# Patient Record
Sex: FEMALE
[De-identification: names, ages, dates, MRNs, and addresses within clinical notes are randomized per-mention and may not be internally consistent; named-entity substitution may affect disease eponyms.]

---

## 2019-08-13 ENCOUNTER — HOSPITAL ENCOUNTER (OUTPATIENT)
Dept: HOSPITAL 5 - ED | Age: 54
Setting detail: OBSERVATION
LOS: 2 days | Discharge: HOME | End: 2019-08-15
Attending: INTERNAL MEDICINE | Admitting: INTERNAL MEDICINE
Payer: COMMERCIAL

## 2019-08-13 DIAGNOSIS — R07.89: Primary | ICD-10-CM

## 2019-08-13 DIAGNOSIS — I10: ICD-10-CM

## 2019-08-13 DIAGNOSIS — Z90.49: ICD-10-CM

## 2019-08-13 DIAGNOSIS — I26.99: ICD-10-CM

## 2019-08-13 DIAGNOSIS — G43.909: ICD-10-CM

## 2019-08-13 DIAGNOSIS — M19.90: ICD-10-CM

## 2019-08-13 DIAGNOSIS — E87.6: ICD-10-CM

## 2019-08-13 LAB
BASOPHILS # (AUTO): 0.1 K/MM3 (ref 0–0.1)
BASOPHILS NFR BLD AUTO: 0.6 % (ref 0–1.8)
BUN SERPL-MCNC: 10 MG/DL (ref 7–17)
BUN/CREAT SERPL: 14 %
CALCIUM SERPL-MCNC: 9 MG/DL (ref 8.4–10.2)
EOSINOPHIL # BLD AUTO: 0 K/MM3 (ref 0–0.4)
EOSINOPHIL NFR BLD AUTO: 0.1 % (ref 0–4.3)
HCT VFR BLD CALC: 38.6 % (ref 30.3–42.9)
HEMOLYSIS INDEX: 16
HGB BLD-MCNC: 13.1 GM/DL (ref 10.1–14.3)
LYMPHOCYTES # BLD AUTO: 2.3 K/MM3 (ref 1.2–5.4)
LYMPHOCYTES NFR BLD AUTO: 19.7 % (ref 13.4–35)
MCHC RBC AUTO-ENTMCNC: 34 % (ref 30–34)
MCV RBC AUTO: 87 FL (ref 79–97)
MONOCYTES # (AUTO): 0.9 K/MM3 (ref 0–0.8)
MONOCYTES % (AUTO): 7.3 % (ref 0–7.3)
PLATELET # BLD: 241 K/MM3 (ref 140–440)
RBC # BLD AUTO: 4.42 M/MM3 (ref 3.65–5.03)

## 2019-08-13 PROCEDURE — 36415 COLL VENOUS BLD VENIPUNCTURE: CPT

## 2019-08-13 PROCEDURE — 93005 ELECTROCARDIOGRAM TRACING: CPT

## 2019-08-13 PROCEDURE — 93010 ELECTROCARDIOGRAM REPORT: CPT

## 2019-08-13 PROCEDURE — 96376 TX/PRO/DX INJ SAME DRUG ADON: CPT

## 2019-08-13 PROCEDURE — 93880 EXTRACRANIAL BILAT STUDY: CPT

## 2019-08-13 PROCEDURE — 85379 FIBRIN DEGRADATION QUANT: CPT

## 2019-08-13 PROCEDURE — A9540 TC99M MAA: HCPCS

## 2019-08-13 PROCEDURE — 99284 EMERGENCY DEPT VISIT MOD MDM: CPT

## 2019-08-13 PROCEDURE — 93306 TTE W/DOPPLER COMPLETE: CPT

## 2019-08-13 PROCEDURE — 85025 COMPLETE CBC W/AUTO DIFF WBC: CPT

## 2019-08-13 PROCEDURE — 80048 BASIC METABOLIC PNL TOTAL CA: CPT

## 2019-08-13 PROCEDURE — A9502 TC99M TETROFOSMIN: HCPCS

## 2019-08-13 PROCEDURE — 78452 HT MUSCLE IMAGE SPECT MULT: CPT

## 2019-08-13 PROCEDURE — 71275 CT ANGIOGRAPHY CHEST: CPT

## 2019-08-13 PROCEDURE — 85007 BL SMEAR W/DIFF WBC COUNT: CPT

## 2019-08-13 PROCEDURE — 93017 CV STRESS TEST TRACING ONLY: CPT

## 2019-08-13 PROCEDURE — A9558 XE133 XENON 10MCI: HCPCS

## 2019-08-13 PROCEDURE — 70450 CT HEAD/BRAIN W/O DYE: CPT

## 2019-08-13 PROCEDURE — 78582 LUNG VENTILAT&PERFUS IMAGING: CPT

## 2019-08-13 PROCEDURE — 96372 THER/PROPH/DIAG INJ SC/IM: CPT

## 2019-08-13 PROCEDURE — G0378 HOSPITAL OBSERVATION PER HR: HCPCS

## 2019-08-13 PROCEDURE — 84484 ASSAY OF TROPONIN QUANT: CPT

## 2019-08-13 PROCEDURE — 96374 THER/PROPH/DIAG INJ IV PUSH: CPT

## 2019-08-13 PROCEDURE — 83036 HEMOGLOBIN GLYCOSYLATED A1C: CPT

## 2019-08-13 PROCEDURE — 96375 TX/PRO/DX INJ NEW DRUG ADDON: CPT

## 2019-08-13 PROCEDURE — 80053 COMPREHEN METABOLIC PANEL: CPT

## 2019-08-13 PROCEDURE — 71045 X-RAY EXAM CHEST 1 VIEW: CPT

## 2019-08-13 PROCEDURE — 85027 COMPLETE CBC AUTOMATED: CPT

## 2019-08-13 RX ADMIN — LOSARTAN POTASSIUM SCH MG: 25 TABLET, FILM COATED ORAL at 20:17

## 2019-08-13 RX ADMIN — ASPIRIN SCH MG: 81 TABLET, COATED ORAL at 20:18

## 2019-08-13 RX ADMIN — Medication SCH ML: at 21:50

## 2019-08-13 RX ADMIN — HYDROMORPHONE HYDROCHLORIDE PRN MG: 1 INJECTION, SOLUTION INTRAMUSCULAR; INTRAVENOUS; SUBCUTANEOUS at 22:58

## 2019-08-13 RX ADMIN — POTASSIUM CHLORIDE SCH MEQ: 1500 TABLET, EXTENDED RELEASE ORAL at 20:18

## 2019-08-13 RX ADMIN — HYDROMORPHONE HYDROCHLORIDE PRN MG: 1 INJECTION, SOLUTION INTRAMUSCULAR; INTRAVENOUS; SUBCUTANEOUS at 20:18

## 2019-08-13 RX ADMIN — ENOXAPARIN SODIUM SCH MG: 100 INJECTION SUBCUTANEOUS at 21:50

## 2019-08-13 RX ADMIN — FAMOTIDINE SCH MG: 10 INJECTION, SOLUTION INTRAVENOUS at 21:50

## 2019-08-13 NOTE — NUCLEAR MEDICINE REPORT
NM lung scan perf/vent



INDICATION:

elevated ddimer, cp, syncope.



TECHNIQUE: 

4.58 mCi of technetium 99m labeled MAA and 8.19 mCi of xenon 133 gas administered



COMPARISON: 

Chest radiograph dated 8/13/2019



FINDINGS:

There are 2 small unmatched perfusion defects in the right apex. No other significant perfusion defec
ts are identified. No focal abnormality is seen in the right apex on chest radiograph







IMPRESSION:

1. VQ scan is intermediate probability for pulmonary embolus..



Signer Name: Matteo Barajas MD 

Signed: 8/13/2019 12:59 PM

 Workstation Name: [id number]

## 2019-08-13 NOTE — XRAY REPORT
CHEST 1 VIEW 



INDICATION:  Left chest pain for one day.



COMPARISON:  None



FINDINGS:

Support devices: None. 



Heart: Within normal limits. 

Lungs/Pleura: No acute air space or interstitial disease. 



Additional findings: None.



IMPRESSION:

 No acute findings.



Signer Name: Christian Braga Jr, MD 

Signed: 8/13/2019 10:40 AM

 Workstation Name: [id number]

## 2019-08-13 NOTE — CAT SCAN REPORT
CT HEAD WITHOUT CONTRAST



INDICATION / CLINICAL INFORMATION:

syncope, mild head injury.



TECHNIQUE:

All CT scans at this location are performed using CT dose reduction for ALARA by means of automated e
xposure control. 



COMPARISON:

None available.



FINDINGS:

HEMORRHAGE: No evidence of intracranial hemorrhage or extra-axial fluid collection.

EXTRA-AXIAL SPACES: Cortical sulci, sylvian fissures and basilar cisterns have an unremarkable appear
ance.

VENTRICULAR SYSTEM: The ventricular system is of normal size and configuration.

CEREBRAL PARENCHYMA: No areas of abnormal brain parenchymal attenuation are identified. There is no i
ndication of recent infarction. 

MIDLINE SHIFT OR HERNIATION: There is no mass effect.

CEREBELLUM / BRAINSTEM: Brainstem and cerebellum have an unremarkable appearance.

INTRACRANIAL VESSELS:No abnormalities are identified on this noncontrast head CT.

ORBITS: visualized portions of the orbits have an unremarkable appearance.

SOFT TISSUES of HEAD: No significant abnormality.

CALVARIUM: Evaluation of bone windows reveals no abnormalities.

PARANASAL SINUSES / MASTOID AIR CELLS: Paranasal sinuses are free from inflammatory mucosal disease. 
Mastoid air cells are normally pneumatized.





IMPRESSION:

1. No acute intracranial abnormality.



Signer Name: Magdaleno Perez MD 

Signed: 8/13/2019 1:04 PM

 Workstation Name: [email or web address]

## 2019-08-13 NOTE — HISTORY AND PHYSICAL REPORT
History of Present Illness


Date of examination: 08/13/19


Date of admission: 


08/13/19 14:24





Chief complaint: 


Left-sided chest pain since a.m.





History of present illness: 


53-year-old  female with history of depression and gastroesophageal 

reflux disease comes in for left-sided chest pain since a.m.  Chest pain is 

retrosternal and intermittent in nature.  Chest pain is about 6 and a scale of 

1-10.  Dull in character radiating to the neck and the back.  No diaphoresis or 

palpitations.  Pain woke her up from sleep this morning.  Patient states she 

also passed out well going to [employer].  No focal weakness.  No recent alcohol or

drug use.  Does not smoke.  No exacerbating or relieving factors.  No recent 

travel.  No swelling of the legs.  No shortness of breath.











Past Medical History


Hypertension: Yes--not on any medications


Arthritis: Yes


Headaches / Migraines: Yes (migraines)


Seizures: Yes (7 years ago x1, due to ETOH)--not on any medications


Psychiatric Treatment:  (depression)





Surgical History


Cholecystectomy: Yes


Additional Surgical History: TWISTED BOWEL.  Hysterectomy, salpingectomy, 

oophorectomy, small bowel resection with reanastomosis





Social History    


Smoking Status: Never Smoker


Substance Use Type: None  





Family history


Hypertension





Medications


Home Medications: 


Only on Celexa 40 mg by mouth daily


Not on any other medications


                                        





Review of Systems


ROS: 


Stated complaint: CHEST PAIN


Other details as noted in HPI


Comment: All other systems reviewed and negative


14 point review of systems done














Medications and Allergies


                                    Allergies











Allergy/AdvReac Type Severity Reaction Status Date / Time


 


acetaminophen [From [place]] Allergy  Vomiting Verified 04/19/15 18:04


 


hydrocodone bitartrate Allergy  Vomiting Verified 04/19/15 18:04





[From [place]]     











                                Home Medications











 Medication  Instructions  Recorded  Confirmed  Last Taken  Type


 


Citalopram Hydrobromide [Celexa] 40 mg PO DAILY 08/13/19 08/13/19 08/13/19 

History











Active Meds: 


Active Medications





Acetaminophen (Tylenol)  650 mg PO Q4H PRN


   PRN Reason: Pain MILD(1-3)/Fever >100.5/HA


Aspirin (Aspirin)  81 mg PO QDAY [facility]RELL[facility]


Enoxaparin Sodium (Lovenox)  40 mg SUB-Q QDAY@2200 [facility]RELL[facility]


Famotidine (Pepcid)  20 mg IV BID [facility]


Hydromorphone HCl (Dilaudid)  0.5 mg IV Q3H PRN


   PRN Reason: Pain , Severe (7-10)


Miscellaneous Medication (Citalopram Hydrobromide [Celexa])  40 mg PO DAILY [facility]


Ondansetron HCl (Zofran)  4 mg IV Q8H PRN


   PRN Reason: Nausea And Vomiting


Oxycodone/Acetaminophen (Percocet 5/325)  1 tab PO Q6H PRN


   PRN Reason: Pain, Moderate (4-6)


Sodium Chloride (Sodium Chloride Flush Syringe 10 Ml)  10 ml IV BID [facility]


Sodium Chloride (Sodium Chloride Flush Syringe 10 Ml)  10 ml IV PRN PRN


   PRN Reason: LINE FLUSH











Exam





- Constitutional


Vitals: 


                                        











Temp Pulse Resp BP Pulse Ox


 


 98.2 F   71   15   158/89   97 


 


 08/13/19 13:30  08/13/19 13:30  08/13/19 15:55  08/13/19 13:30  08/13/19 13:30














Results





- Labs


CBC & Chem 7: 


                                 08/13/19 10:00





                                 08/13/19 10:00


Labs: 


                             Laboratory Last Values











WBC  11.7 K/mm3 (4.5-11.0)  H  08/13/19  10:00    


 


RBC  4.42 M/mm3 (3.65-5.03)   08/13/19  10:00    


 


Hgb  13.1 gm/dl (10.1-14.3)   08/13/19  10:00    


 


Hct  38.6 % (30.3-42.9)   08/13/19  10:00    


 


MCV  87 fl (79-97)   08/13/19  10:00    


 


MCH  30 pg (28-32)   08/13/19  10:00    


 


MCHC  34 % (30-34)   08/13/19  10:00    


 


RDW  14.3 % (13.2-15.2)   08/13/19  10:00    


 


Plt Count  241 K/mm3 (140-440)   08/13/19  10:00    


 


Lymph % (Auto)  19.7 % (13.4-35.0)   08/13/19  10:00    


 


Mono % (Auto)  7.3 % (0.0-7.3)   08/13/19  10:00    


 


Eos % (Auto)  0.1 % (0.0-4.3)   08/13/19  10:00    


 


Baso % (Auto)  0.6 % (0.0-1.8)   08/13/19  10:00    


 


Lymph #  2.3 K/mm3 (1.2-5.4)   08/13/19  10:00    


 


Mono #  0.9 K/mm3 (0.0-0.8)  H  08/13/19  10:00    


 


Eos #  0.0 K/mm3 (0.0-0.4)   08/13/19  10:00    


 


Baso #  0.1 K/mm3 (0.0-0.1)   08/13/19  10:00    


 


Seg Neutrophils %  72.3 % (40.0-70.0)  H  08/13/19  10:00    


 


Seg Neutrophils #  8.5 K/mm3 (1.8-7.7)  H  08/13/19  10:00    


 


  556.32 ng/mlDDU (0-234)  H  08/13/19  10:00    


 


Sodium  137 mmol/L (137-145)   08/13/19  10:00    


 


Potassium  3.5 mmol/L (3.6-5.0)  L  08/13/19  10:00    


 


Chloride  99.9 mmol/L ()   08/13/19  10:00    


 


Carbon Dioxide  22 mmol/L (22-30)   08/13/19  10:00    


 


  19 mmol/L  08/13/19  10:00    


 


BUN  10 mg/dL (7-17)   08/13/19  10:00    


 


  0.7 mg/dL (0.7-1.2)   08/13/19  10:00    


 


Estimated GFR  > 60 ml/min  08/13/19  10:00    


 


  14 %  08/13/19  10:00    


 


Glucose  95 mg/dL ()   08/13/19  10:00    


 


Calcium  9.0 mg/dL (8.4-10.2)   08/13/19  10:00    


 


  < 0.010 ng/mL (0.00-0.029)   08/13/19  15:42    














- Imaging and Cardiology


EKG: report reviewed (normal sinus rhythm, heart rate of 90/m no ST-T wave 

changes)


Chest x-ray: report reviewed (no acute findings)


CT Scan - head: report reviewed (no acute findings)





Assessment and Plan


Advance Directives: Yes (full code)


VTE prophylaxis?: Chemical


Plan of care discussed with patient/family: Yes





- Patient Problems


(1) Chest pain


Current Visit: Yes   Status: Acute   


Qualifiers: 


   Chest pain type: unspecified   Qualified Code(s): R07.9 - Chest pain, 

unspecified   


Plan to address problem: 


Chest pain rule out MI protocol


Serial troponins


Lexiscan in the morning


GERD in the differential diagnosis


Costochondritis unlikely--no chest wall tenderness








(2) Hypertension


Current Visit: Yes   Status: Chronic   


Qualifiers: 


   Hypertension type: unspecified   Qualified Code(s): I10 - Essential (primary)

 hypertension   


Plan to address problem: 


Borderline


Will initiate on losartan 25 mg once a day








(3) Depression


Current Visit: Yes   Status: Chronic   


Qualifiers: 


   Depression Type: unspecified   Qualified Code(s): F32.9 - Major depressive 

disorder, single episode, unspecified   


Plan to address problem: 


Continue Celexa








(4) Hypokalemia


Current Visit: Yes   Status: Acute   


Plan to address problem: 


Mild


Supplemented








(5) DVT prophylaxis


Current Visit: Yes   Status: Acute   


Plan to address problem: 


Patient on Lovenox and GI prophylaxis

## 2019-08-13 NOTE — EMERGENCY DEPARTMENT REPORT
ED Chest Pain HPI





- General


Chief Complaint: Chest Pain


Stated Complaint: CHEST PAIN


Time Seen by Provider: 08/13/19 10:18


Source: patient, EMS


Mode of arrival: Stretcher


Limitations: No Limitations





- History of Present Illness


Initial Comments: 





53-year-old female the past medical history of migraines, hypertension, 

depression, previous alcohol abuse presents to the hospital foc chest pain since

midnight.  Pain is anterior, left-sided, and radiates to the back.  Pain is 

moderate to severe, worse with inspiration, movement, and palpation rated 9/10 i

n intensity.  Patient also had associated shortness of breath and numbness to 

her hands.  Pain woke her up from sleep this morning at 6 AM.  She did she 

thought it was gas and took a Zantac and baking soda without relief.  She 

attempted to go to [employer] and while walking from her car she had a syncopal 

episode and struck the back of her head.  He denies blurry vision, focal 

weakness, or focal numbness.  She states she has not use alcohol or any drugs 

recently.  She only medication she takes is Celexa for depression.  She denies 

calf tenderness, leg edema, history of PE/DVT, or recent travel.  She also 

denies a personal history of hypertension, elevated cholesterol, and diabetes.


Severity scale (0 -10): 9





- Related Data


                                Home Medications











 Medication  Instructions  Recorded  Confirmed  Last Taken


 


ALPRAZolam [Xanax TAB] 0.5 mg PO PRN PRN 03/12/15 06/18/15 04/19/15 09:00


 


buPROPion SR [Wellbutrin SR] 150 mg PO DAILY 03/12/15 06/18/15 06/16/15


 


Linzess 1 tab PO DAILY 06/18/15 06/18/15 06/17/15


 


traMADol 1 tab PO Q6HR 06/18/15 06/18/15 06/18/15








                                  Previous Rx's











 Medication  Instructions  Recorded  Last Taken  Type


 


Ibuprofen [Motrin 800 MG tab] 800 mg PO Q8H PRN #30 tablet 03/27/15 04/19/15 

09:00 Rx


 


Pantoprazole Sodium [Protonix] 40 mg PO QDAY #30 tablet. 04/19/15 Unknown Rx


 


Sulfamethoxazole/Trimethoprim 1 each PO BID #14 tablet 04/19/15 Unknown Rx





[Bactrim Ds]    


 


oxyCODONE /ACETAMINOPHEN [Percocet 1 tab PO Q6HR PRN #10 tablet 04/19/15 Unknown

 Rx





5/325 mg]    











                                    Allergies











Allergy/AdvReac Type Severity Reaction Status Date / Time


 


acetaminophen [From [place]co] Allergy  Vomiting Verified 04/19/15 18:04


 


hydrocodone bitartrate Allergy  Vomiting Verified 04/19/15 18:04





[From [place]]     














Heart Score





- HEART Score


History: Slightly suspicious


EKG: Non-specific


Age: 45-65


Risk factors: 1-2 risk factors


Troponin: < normal limit


HEART Score: 3





ED Review of Systems


ROS: 


Stated complaint: CHEST PAIN


Other details as noted in HPI





Comment: All other systems reviewed and negative





ED Past Medical Hx





- Past Medical History


Hx Hypertension: Yes


Hx Congestive Heart Failure: No


Hx Diabetes: No


Hx Liver Disease: No


Hx Renal Disease: No


Hx Arthritis: Yes


Hx Headaches / Migraines: Yes (migraines)


Hx Seizures: Yes (7 years ago x1, due to ETOH)


Hx Psychiatric Treatment:  (depression)


Hx Asthma: No


Hx COPD: No





- Surgical History


Hx Cholecystectomy: Yes


Additional Surgical History: TWISTED BOWEL.  Hysterectomy, salpingectomy, 

oophorectomy, small bowel resection with reanastomosis





- Social History


Smoking Status: Never Smoker


Substance Use Type: None





- Medications


Home Medications: 


                                Home Medications











 Medication  Instructions  Recorded  Confirmed  Last Taken  Type


 


ALPRAZolam [Xanax TAB] 0.5 mg PO PRN PRN 03/12/15 06/18/15 04/19/15 09:00 

History


 


buPROPion SR [Wellbutrin SR] 150 mg PO DAILY 03/12/15 06/18/15 06/16/15 History


 


Ibuprofen [Motrin 800 MG tab] 800 mg PO Q8H PRN #30 tablet 03/27/15 06/18/15 0

4/19/15 09:00 Rx


 


Pantoprazole Sodium [Protonix] 40 mg PO QDAY #30 tablet. 04/19/15 06/18/15 

Unknown Rx


 


Sulfamethoxazole/Trimethoprim 1 each PO BID #14 tablet 04/19/15 06/18/15 Unknown

 Rx





[Bactrim Ds]     


 


oxyCODONE /ACETAMINOPHEN [Percocet 1 tab PO Q6HR PRN #10 tablet 04/19/15 

06/18/15 Unknown Rx





5/325 mg]     


 


Linzess 1 tab PO DAILY 06/18/15 06/18/15 06/17/15 History


 


traMADol 1 tab PO Q6HR 06/18/15 06/18/15 06/18/15 History














ED Physical Exam





- General


Limitations: No Limitations





- Other


Other exam information: 





General:  no acute distress


Head: Atraumatic, normocephalic


Eyes:  Normal appearance, pupils equal and reactive to light, extraocular 

movements intact


ENT: normal oropharynx


Neck: Normal appearance,  no stridor, no meningismus, no midline tenderness.


Cardiovascular: Regular rate and rhythm, anterior chest wall tenderness on 

palpation


Chest: Clear to auscultation, no wheezes, rales, or crackles


Abdomen: nondistended, soft, nontender, no rebound or guarding


Extremity: Normal appearance, no deformity, full range of motion, no calf 

tenderness or leg edema


Neuro: Alert and oriented 3, clear speech, no gross motor or sensory deficit


Skin: No warmth, erythema





ED Course





                                   Vital Signs











  08/13/19 08/13/19 08/13/19





  09:56 10:35 11:00


 


Temperature 99.6 F  


 


Pulse Rate 91 H  78


 


Respiratory 19 20 16





Rate   


 


Blood Pressure 145/86  146/95





[Right]   


 


O2 Sat by Pulse 98  96





Oximetry   














  08/13/19 08/13/19





  13:30 13:42


 


Temperature 98.2 F 


 


Pulse Rate 71 


 


Respiratory 16 16





Rate  


 


Blood Pressure 158/89 





[Right]  


 


O2 Sat by Pulse 97 





Oximetry  














ARIELLA score





- Ariella Score


Age > 65: (0) No


Aspirin use within the Past 7 Days: (0) No


3 or more CAD Risk Factors: (0) No


2 or more Angina events in past 24 hrs: (0) No


Known CAD with more than 50% Stenosis: (0) No


Elevated Cardiac Markers: (0) No





ED Medical Decision Making





- Lab Data


Result diagrams: 


                                 08/13/19 10:00





                                 08/13/19 10:00








                                   Lab Results











  08/13/19 08/13/19 08/13/19 Range/Units





  10:00 10:00 10:00 


 


WBC  11.7 H    (4.5-11.0)  K/mm3


 


RBC  4.42    (3.65-5.03)  M/mm3


 


Hgb  13.1    (10.1-14.3)  gm/dl


 


Hct  38.6    (30.3-42.9)  %


 


MCV  87    (79-97)  fl


 


MCH  30    (28-32)  pg


 


MCHC  34    (30-34)  %


 


RDW  14.3    (13.2-15.2)  %


 


Plt Count  241    (140-440)  K/mm3


 


Lymph % (Auto)  19.7    (13.4-35.0)  %


 


Mono % (Auto)  7.3    (0.0-7.3)  %


 


Eos % (Auto)  0.1    (0.0-4.3)  %


 


Baso % (Auto)  0.6    (0.0-1.8)  %


 


Lymph #  2.3    (1.2-5.4)  K/mm3


 


Mono #  0.9 H    (0.0-0.8)  K/mm3


 


Eos #  0.0    (0.0-0.4)  K/mm3


 


Baso #  0.1    (0.0-0.1)  K/mm3


 


Seg Neutrophils %  72.3 H    (40.0-70.0)  %


 


Seg Neutrophils #  8.5 H    (1.8-7.7)  K/mm3


 


D-Dimer    556.32 H  (0-234)  ng/mlDDU


 


Sodium   137   (137-145)  mmol/L


 


Potassium   3.5 L   (3.6-5.0)  mmol/L


 


Chloride   99.9   ()  mmol/L


 


Carbon Dioxide   22   (22-30)  mmol/L


 


Anion Gap   19   mmol/L


 


BUN   10   (7-17)  mg/dL


 


Creatinine   0.7   (0.7-1.2)  mg/dL


 


Estimated GFR   > 60   ml/min


 


BUN/Creatinine Ratio   14   %


 


Glucose   95   ()  mg/dL


 


Calcium   9.0   (8.4-10.2)  mg/dL


 


Troponin T   < 0.010   (0.00-0.029)  ng/mL














  08/13/19 Range/Units





  13:09 


 


WBC   (4.5-11.0)  K/mm3


 


RBC   (3.65-5.03)  M/mm3


 


Hgb   (10.1-14.3)  gm/dl


 


Hct   (30.3-42.9)  %


 


MCV   (79-97)  fl


 


MCH   (28-32)  pg


 


MCHC   (30-34)  %


 


RDW   (13.2-15.2)  %


 


Plt Count   (140-440)  K/mm3


 


Lymph % (Auto)   (13.4-35.0)  %


 


Mono % (Auto)   (0.0-7.3)  %


 


Eos % (Auto)   (0.0-4.3)  %


 


Baso % (Auto)   (0.0-1.8)  %


 


Lymph #   (1.2-5.4)  K/mm3


 


Mono #   (0.0-0.8)  K/mm3


 


Eos #   (0.0-0.4)  K/mm3


 


Baso #   (0.0-0.1)  K/mm3


 


Seg Neutrophils %   (40.0-70.0)  %


 


Seg Neutrophils #   (1.8-7.7)  K/mm3


 


D-Dimer   (0-234)  ng/mlDDU


 


Sodium   (137-145)  mmol/L


 


Potassium   (3.6-5.0)  mmol/L


 


Chloride   ()  mmol/L


 


Carbon Dioxide   (22-30)  mmol/L


 


Anion Gap   mmol/L


 


BUN   (7-17)  mg/dL


 


Creatinine   (0.7-1.2)  mg/dL


 


Estimated GFR   ml/min


 


BUN/Creatinine Ratio   %


 


Glucose   ()  mg/dL


 


Calcium   (8.4-10.2)  mg/dL


 


Troponin T  < 0.010  (0.00-0.029)  ng/mL














- EKG Data


-: EKG Interpreted by Me


EKG shows normal: sinus rhythm, axis (qrs -65), QRS complexes (qrsd 88), ST-T 

waves (no stemi/t inv)


Rate: normal (90)





- Radiology Data


Radiology results: report reviewed








CHEST 1 VIEW 





INDICATION: Left chest pain for one day. 





COMPARISON: None 





FINDINGS: 


Support devices: None. 





Heart: Within normal limits. 


Lungs/Pleura: No acute air space or interstitial disease. 





Additional findings: None. 





IMPRESSION: 


No acute findings. 





CT HEAD WITHOUT CONTRAST 





INDICATION / CLINICAL INFORMATION: 


syncope, mild head injury. 





TECHNIQUE: 


All CT scans at this location are performed using CT dose reduction for ALARA by

 means of automated


exposure control. 





COMPARISON: 


None available. 





FINDINGS: 


HEMORRHAGE: No evidence of intracranial hemorrhage or extra-axial fluid 

collection. 


EXTRA-AXIAL SPACES: Cortical sulci, sylvian fissures and basilar cisterns have 

an unremarkable 


appearance. 


VENTRICULAR SYSTEM: The ventricular system is of normal size and configuration. 


CEREBRAL PARENCHYMA: No areas of abnormal brain parenchymal attenuation are 

identified. There is no


indication of recent infarction. 


MIDLINE SHIFT OR HERNIATION: There is no mass effect. 


CEREBELLUM / BRAINSTEM: Brainstem and cerebellum have an unremarkable 

appearance. 


INTRACRANIAL VESSELS:No abnormalities are identified on this noncontrast head 

CT. 


ORBITS: visualized portions of the orbits have an unremarkable appearance. 


SOFT TISSUES of HEAD: No significant abnormality. 


CALVARIUM: Evaluation of bone windows reveals no abnormalities. 


PARANASAL SINUSES / MASTOID AIR CELLS: Paranasal sinuses are free from 

inflammatory mucosal 


disease. Mastoid air cells are normally pneumatized. 








IMPRESSION: 


1. No acute intracranial abnormality. 





NM lung scan perf/vent 





INDICATION: 


elevated ddimer, cp, syncope. 





TECHNIQUE: 


4.58 mCi of technetium 99m labeled MAA and 8.19 mCi of xenon 133 gas 

administered 





COMPARISON: 


Chest radiograph dated 8/13/2019 





FINDINGS: 


There are 2 small unmatched perfusion defects in the right apex. No other 

significant perfusion 


defects are identified. No focal abnormality is seen in the right apex on chest 

radiograph 











IMPRESSION: 


1. VQ scan is intermediate probability for pulmonary embolus.. 





- Medical Decision Making


Physical chest pain, shortness of breath, and syncope.


Patient's elevated d-dimer with intermittent probability of a pulmonary embolism

 on V/Q exam.  She was provided  Lovenox and pain medicine and will be admitted 

to the hospital.





- Differential Diagnosis


costochondritis, MI, unstable angina, PE, dissection, arrhythmia, seizure


Critical Care Time: No


Critical care attestation.: 


If time is entered above; I have spent that time in minutes in the direct care 

of this critically ill patient, excluding procedure time.








ED Disposition


Clinical Impression: 


 Chest pain, Pulmonary embolism, Syncope





Disposition: [id number] OP ADMIT IP TO THIS HOSP


Is pt being admited?: Yes


Condition: Stable


Time of Disposition: 14:13 (Dr. Granados/hospitalist)

## 2019-08-14 LAB
ALBUMIN SERPL-MCNC: 4.2 G/DL (ref 3.9–5)
ALT SERPL-CCNC: 10 UNITS/L (ref 7–56)
BAND NEUTROPHILS # (MANUAL): 0.1 K/MM3
BUN SERPL-MCNC: 20 MG/DL (ref 7–17)
BUN/CREAT SERPL: 18 %
CALCIUM SERPL-MCNC: 8.8 MG/DL (ref 8.4–10.2)
HCT VFR BLD CALC: 40.2 % (ref 30.3–42.9)
HEMOLYSIS INDEX: 5
HGB BLD-MCNC: 13.4 GM/DL (ref 10.1–14.3)
MCHC RBC AUTO-ENTMCNC: 33 % (ref 30–34)
MCV RBC AUTO: 88 FL (ref 79–97)
MYELOCYTES # (MANUAL): 0 K/MM3
PLATELET # BLD: 271 K/MM3 (ref 140–440)
PROMYELOCYTES # (MANUAL): 0 K/MM3
RBC # BLD AUTO: 4.56 M/MM3 (ref 3.65–5.03)
TOTAL CELLS COUNTED BLD: 100

## 2019-08-14 RX ADMIN — HYDROMORPHONE HYDROCHLORIDE PRN MG: 1 INJECTION, SOLUTION INTRAMUSCULAR; INTRAVENOUS; SUBCUTANEOUS at 10:17

## 2019-08-14 RX ADMIN — POTASSIUM CHLORIDE SCH MEQ: 1500 TABLET, EXTENDED RELEASE ORAL at 10:05

## 2019-08-14 RX ADMIN — HYDROMORPHONE HYDROCHLORIDE PRN MG: 1 INJECTION, SOLUTION INTRAMUSCULAR; INTRAVENOUS; SUBCUTANEOUS at 13:29

## 2019-08-14 RX ADMIN — Medication SCH ML: at 10:09

## 2019-08-14 RX ADMIN — OXYCODONE AND ACETAMINOPHEN PRN TAB: 5; 325 TABLET ORAL at 19:22

## 2019-08-14 RX ADMIN — Medication SCH ML: at 21:00

## 2019-08-14 RX ADMIN — HYDROMORPHONE HYDROCHLORIDE PRN MG: 1 INJECTION, SOLUTION INTRAMUSCULAR; INTRAVENOUS; SUBCUTANEOUS at 06:03

## 2019-08-14 RX ADMIN — ENOXAPARIN SODIUM SCH MG: 100 INJECTION SUBCUTANEOUS at 21:00

## 2019-08-14 RX ADMIN — HYDROMORPHONE HYDROCHLORIDE PRN MG: 1 INJECTION, SOLUTION INTRAMUSCULAR; INTRAVENOUS; SUBCUTANEOUS at 23:59

## 2019-08-14 RX ADMIN — HYDROMORPHONE HYDROCHLORIDE PRN MG: 1 INJECTION, SOLUTION INTRAMUSCULAR; INTRAVENOUS; SUBCUTANEOUS at 20:51

## 2019-08-14 RX ADMIN — HYDROMORPHONE HYDROCHLORIDE PRN MG: 1 INJECTION, SOLUTION INTRAMUSCULAR; INTRAVENOUS; SUBCUTANEOUS at 02:06

## 2019-08-14 RX ADMIN — FAMOTIDINE SCH MG: 10 INJECTION, SOLUTION INTRAVENOUS at 21:00

## 2019-08-14 RX ADMIN — FAMOTIDINE SCH MG: 10 INJECTION, SOLUTION INTRAVENOUS at 10:06

## 2019-08-14 RX ADMIN — ASPIRIN SCH MG: 81 TABLET, COATED ORAL at 10:06

## 2019-08-14 RX ADMIN — LOSARTAN POTASSIUM SCH MG: 25 TABLET, FILM COATED ORAL at 10:17

## 2019-08-14 RX ADMIN — HYDROMORPHONE HYDROCHLORIDE PRN MG: 1 INJECTION, SOLUTION INTRAMUSCULAR; INTRAVENOUS; SUBCUTANEOUS at 17:25

## 2019-08-14 NOTE — PROGRESS NOTE
Assessment and Plan


Assessment and plan: 


Patient is a 54 yo  woman with a history of depression, OA, migraines, 

GERD, previous alcohol abuse and extensive secondhand smoke exposure who 

presented with CP, sob after syncopal episodes. Patient also had associated 

shortness of breath and numbness to her hands.  Pain woke her up from sleep this

morning at 6 AM.  She did she thought it was gas and took a Zantac and baking 

soda without relief.  [facility] and she had a syncopal 

episode and struck the back of her head and back. S He denies blurry vision, 

focal weakness, or focal numbness.  She states she has not use alcohol or any 

drugs recently.  She only medication she takes is Celexa for depression.  She 

denies calf tenderness, leg edema, history of PE/DVT, or recent travel.  





* EKG: report reviewed (normal sinus rhythm, heart rate of 90/m no ST-T wave 

  changes)


* Chest x-ray: report reviewed (no acute findings)


* CT Scan - head: report reviewed (no acute findings)


* V/Q scan lungs: intermediate for PE





Chest pains, appears GERD related > PE: ordered CTA chest


Elevated D-Dimer: v/q intermediate, low pre-test, get stat CTA chest


Syncope: ordered neurochecks, ECHO and Carotid u/s, UDS and ETOH wasn't done on 

admission, low value now


Hypertension suspected initiated on losartan 25 mg once a day by Dr. Roberta Siddiqui, denies SI: Continue Celexa


GERD: treat with ppi


Hypokalemia, Mild: Supplemented, monitor bmp


DVT prophylaxis reviewed





Disposition: continue inpatient care, stress test delay because v/q done, no 

exercise stress due to back pains and syncope history, get CTA chest, ECHO, 

carotid u/s








History


Interval history: 


Patient was seen and examined. Follow-up on current diagnosis of Chest pains. No

overnight events reported to me. Patient denies any shortness breath, 

nausea/vomiting or severe headaches. Imaging, nursing note, chart, labs and old 

chart reviewed. Discussed with patient. She c/o occipital headaches and upper 

left back severe pains after fall/syncopal episode








Hospitalist Physical





- Physical exam


Narrative exam: 


Gen: WDWN, NAD, Awake, Alert, Orientated 


HEENT: NCAT, EOMI, PERRL, OP Clear 


Neck: supple, no adenopathy, no thyromegaly, no JVD 


CVS/Heart: RRR, normal S1S2, pulses present bilaterally 


Chest/Lungs: CTA B, Symmetrical chest expansion, good air entry bilaterally 


GI/Abdomen: soft, NTND, good bowel sounds, no guarding or rebound 


/Bladder: no suprapubic tenderness, no CVA or paraspinal tenderness 


Extermity/Skin: no c/c/e, no obvious rash 


MSK: FROM x 4 


Neuro: CN 2-12 grossly intact, no new focal deficits 


Psych: calm 





- Constitutional


Vitals: 


                                        











Temp Pulse Resp BP Pulse Ox


 


 97.8 F   89   18   104/79   96 


 


 08/14/19 11:22  08/14/19 11:22  08/14/19 11:22  08/14/19 11:22  08/14/19 11:22














Results





- Labs


CBC & Chem 7: 


                                 08/14/19 04:28





                                 08/14/19 04:28


Labs: 


                             Laboratory Last Values











WBC  11.6 K/mm3 (4.5-11.0)  H  08/14/19  04:28    


 


RBC  4.56 M/mm3 (3.65-5.03)   08/14/19  04:28    


 


Hgb  13.4 gm/dl (10.1-14.3)   08/14/19  04:28    


 


Hct  40.2 % (30.3-42.9)   08/14/19  04:28    


 


MCV  88 fl (79-97)   08/14/19  04:28    


 


MCH  29 pg (28-32)   08/14/19  04:28    


 


MCHC  33 % (30-34)   08/14/19  04:28    


 


RDW  14.5 % (13.2-15.2)   08/14/19  04:28    


 


Plt Count  271 K/mm3 (140-440)   08/14/19  04:28    


 


Lymph % (Auto)  19.7 % (13.4-35.0)   08/13/19  10:00    


 


Mono % (Auto)  7.3 % (0.0-7.3)   08/13/19  10:00    


 


Eos % (Auto)  0.1 % (0.0-4.3)   08/13/19  10:00    


 


Baso % (Auto)  0.6 % (0.0-1.8)   08/13/19  10:00    


 


Lymph #  Np   08/14/19  04:28    


 


Mono #  0.9 K/mm3 (0.0-0.8)  H  08/13/19  10:00    


 


Eos #  0.0 K/mm3 (0.0-0.4)   08/13/19  10:00    


 


Baso #  0.1 K/mm3 (0.0-0.1)   08/13/19  10:00    


 


Add Manual Diff  Complete   08/14/19  04:28    


 


Total Counted  100   08/14/19  04:28    


 


Seg Neutrophils %  72.3 % (40.0-70.0)  H  08/13/19  10:00    


 


Seg Neuts % (Manual)  54.0 % (40.0-70.0)   08/14/19  04:28    


 


  1.0 %  08/14/19  04:28    


 


  41.0 % (13.4-35.0)  H  08/14/19  04:28    


 


Reactive Lymphs % (Man)  0 %  08/14/19  04:28    


 


  4.0 % (0.0-7.3)   08/14/19  04:28    


 


  0 % (0.0-4.3)   08/14/19  04:28    


 


  0 % (0.0-1.8)   08/14/19  04:28    


 


  0 %  08/14/19  04:28    


 


  0 %  08/14/19  04:28    


 


  0 %  08/14/19  04:28    


 


  0 %  08/14/19  04:28    


 


Nucleated RBC %  Not Reportable   08/14/19  04:28    


 


Seg Neutrophils #  8.5 K/mm3 (1.8-7.7)  H  08/13/19  10:00    


 


Seg Neutrophils # Man  6.3 K/mm3 (1.8-7.7)   08/14/19  04:28    


 


Band Neutrophils #  0.1 K/mm3  08/14/19  04:28    


 


  4.8 K/mm3 (1.2-5.4)   08/14/19  04:28    


 


Abs React Lymphs (Man)  0.0 K/mm3  08/14/19  04:28    


 


  0.5 K/mm3 (0.0-0.8)   08/14/19  04:28    


 


  0.0 K/mm3 (0.0-0.4)   08/14/19  04:28    


 


  0.0 K/mm3 (0.0-0.1)   08/14/19  04:28    


 


  0.0 K/mm3  08/14/19  04:28    


 


  0.0 K/mm3  08/14/19  04:28    


 


  0.0 K/mm3  08/14/19  04:28    


 


Blast Cells #  0.0 K/mm3  08/14/19  04:28    


 


WBC Morphology  Not Reportable   08/14/19  04:28    


 


Hypersegmented Neuts  Not Reportable   08/14/19  04:28    


 


Hyposegmented Neuts  Not Reportable   08/14/19  04:28    


 


Hypogranular Neuts  Not Reportable   08/14/19  04:28    


 


  Not Reportable   08/14/19  04:28    


 


  Not Reportable   08/14/19  04:28    


 


  Not Reportable   08/14/19  04:28    


 


  Not Reportable   08/14/19  04:28    


 


  Not Reportable   08/14/19  04:28    


 


  Not Reportable   08/14/19  04:28    


 


  Consistent w auto   08/14/19  04:28    


 


  Not Reportable   08/14/19  04:28    


 


Plt Clumps, EDTA  Not Reportable   08/14/19  04:28    


 


  Not Reportable   08/14/19  04:28    


 


  Not Reportable   08/14/19  04:28    


 


  Not Reportable   08/14/19  04:28    


 


Plt Morphology Comment  Not Reportable   08/14/19  04:28    


 


RBC Morphology  Not Reportable   08/14/19  04:28    


 


Dimorphic RBCs  Not Reportable   08/14/19  04:28    


 


  Not Reportable   08/14/19  04:28    


 


  Not Reportable   08/14/19  04:28    


 


  Not Reportable   08/14/19  04:28    


 


  Few   08/14/19  04:28    


 


  Not Reportable   08/14/19  04:28    


 


  Not Reportable   08/14/19  04:28    


 


  Not Reportable   08/14/19  04:28    


 


  Not Reportable   08/14/19  04:28    


 


  Not Reportable   08/14/19  04:28    


 


  Not Reportable   08/14/19  04:28    


 


  Not Reportable   08/14/19  04:28    


 


  Not Reportable   08/14/19  04:28    


 


  Not Reportable   08/14/19  04:28    


 


  Not Reportable   08/14/19  04:28    


 


  Not Reportable   08/14/19  04:28    


 


  Not Reportable   08/14/19  04:28    


 


  Not Reportable   08/14/19  04:28    


 


  Not Reportable   08/14/19  04:28    


 


  Rare   08/14/19  04:28    


 


Acanthocytes (Spur)  Not Reportable   08/14/19  04:28    


 


Rouleaux  Not Reportable   08/14/19  04:28    


 


  Not Reportable   08/14/19  04:28    


 


  Not Reportable   08/14/19  04:28    


 


  Not Reportable   08/14/19  04:28    


 


  Not Reportable   08/14/19  04:28    


 


Hem Pathologist Commnt  No   08/14/19  04:28    


 


  556.32 ng/mlDDU (0-234)  H  08/13/19  10:00    


 


Sodium  140 mmol/L (137-145)   08/14/19  04:28    


 


Potassium  4.2 mmol/L (3.6-5.0)   08/14/19  04:28    


 


Chloride  100.2 mmol/L ()   08/14/19  04:28    


 


Carbon Dioxide  26 mmol/L (22-30)   08/14/19  04:28    


 


  18 mmol/L  08/14/19  04:28    


 


BUN  20 mg/dL (7-17)  H  08/14/19  04:28    


 


  1.1 mg/dL (0.7-1.2)  D 08/14/19  04:28    


 


Estimated GFR  52 ml/min  08/14/19  04:28    


 


  18 %  08/14/19  04:28    


 


Glucose  97 mg/dL ()   08/14/19  04:28    


 


  5.4 % (4-6)   08/13/19  20:49    


 


Calcium  8.8 mg/dL (8.4-10.2)   08/14/19  04:28    


 


  < 0.20 mg/dL (0.1-1.2)   08/14/19  04:28    


 


AST  12 units/L (5-40)   08/14/19  04:28    


 


ALT  10 units/L (7-56)   08/14/19  04:28    


 


  92 units/L ()   08/14/19  04:28    


 


  < 0.010 ng/mL (0.00-0.029)   08/14/19  01:55    


 


  7.4 g/dL (6.3-8.2)   08/14/19  04:28    


 


  4.2 g/dL (3.9-5)   08/14/19  04:28    


 


  1.3 %  08/14/19  04:28    














Active Medications





- Current Medications


Current Medications: 














Generic Name Dose Route Start Last Admin





  Trade Name Freq  PRN Reason Stop Dose Admin


 


Acetaminophen  650 mg  08/13/19 19:15  08/14/19 00:28





  Tylenol  PO   650 mg





  Q4H PRN   Administration





  Pain MILD(1-3)/Fever >100.5/HA  


 


Aspirin  81 mg  08/13/19 20:00  08/14/19 10:06





  Halfprin Ec  PO   81 mg





  QDAY ESTRELLA   Administration


 


Citalopram Hydrobromide  40 mg  08/13/19 20:00  08/14/19 10:05





  Celexa  PO   40 mg





  DAILY ESTRELLA   Administration


 


Enoxaparin Sodium  40 mg  08/13/19 22:00  08/13/19 21:50





  Lovenox  SUB-Q   40 mg





  QDAY@2200 ESTRELLA   Administration


 


Famotidine  20 mg  08/13/19 22:00  08/14/19 10:06





  Pepcid  IV   20 mg





  BID ESTRELLA   Administration


 


Hydromorphone HCl  0.5 mg  08/13/19 19:15  08/14/19 17:25





  Dilaudid  IV   0.5 mg





  Q3H PRN   Administration





  Pain , Severe (7-10)  


 


Losartan Potassium  25 mg  08/13/19 20:00  08/14/19 10:17





  Cozaar  PO   25 mg





  QDAY ESTRELLA   Administration


 


Ondansetron HCl  4 mg  08/13/19 19:15 





  Zofran  IV  





  Q8H PRN  





  Nausea And Vomiting  


 


Oxycodone/Acetaminophen  1 tab  08/13/19 19:15 





  Percocet 5/325  PO  





  Q6H PRN  





  Pain, Moderate (4-6)  


 


Potassium Chloride  20 meq  08/13/19 20:00  08/14/19 10:05





  K-Dur  PO   20 meq





  QDAY ESTRELLA   Administration


 


Sodium Chloride  10 ml  08/13/19 22:00  08/14/19 10:09





  Sodium Chloride Flush Syringe 10 Ml  IV   10 ml





  BID ESTRELLA   Administration


 


Sodium Chloride  10 ml  08/13/19 19:15  08/13/19 22:59





  Sodium Chloride Flush Syringe 10 Ml  IV   10 ml





  PRN PRN   Administration





  LINE FLUSH

## 2019-08-15 VITALS — SYSTOLIC BLOOD PRESSURE: 129 MMHG | DIASTOLIC BLOOD PRESSURE: 86 MMHG

## 2019-08-15 LAB
BUN SERPL-MCNC: 26 MG/DL (ref 7–17)
BUN/CREAT SERPL: 37 %
CALCIUM SERPL-MCNC: 8.3 MG/DL (ref 8.4–10.2)
HCT VFR BLD CALC: 34.4 % (ref 30.3–42.9)
HEMOLYSIS INDEX: 59
HGB BLD-MCNC: 11.9 GM/DL (ref 10.1–14.3)
MCHC RBC AUTO-ENTMCNC: 35 % (ref 30–34)
MCV RBC AUTO: 88 FL (ref 79–97)
PLATELET # BLD: 220 K/MM3 (ref 140–440)
RBC # BLD AUTO: 3.92 M/MM3 (ref 3.65–5.03)

## 2019-08-15 RX ADMIN — LOSARTAN POTASSIUM SCH: 25 TABLET, FILM COATED ORAL at 12:48

## 2019-08-15 RX ADMIN — FAMOTIDINE SCH: 10 INJECTION, SOLUTION INTRAVENOUS at 12:49

## 2019-08-15 RX ADMIN — OXYCODONE AND ACETAMINOPHEN PRN TAB: 5; 325 TABLET ORAL at 01:52

## 2019-08-15 RX ADMIN — ASPIRIN SCH: 81 TABLET, COATED ORAL at 12:48

## 2019-08-15 RX ADMIN — POTASSIUM CHLORIDE SCH: 1500 TABLET, EXTENDED RELEASE ORAL at 12:49

## 2019-08-15 RX ADMIN — HYDROMORPHONE HYDROCHLORIDE PRN MG: 1 INJECTION, SOLUTION INTRAMUSCULAR; INTRAVENOUS; SUBCUTANEOUS at 09:09

## 2019-08-15 RX ADMIN — OXYCODONE AND ACETAMINOPHEN PRN TAB: 5; 325 TABLET ORAL at 14:38

## 2019-08-15 RX ADMIN — HYDROMORPHONE HYDROCHLORIDE PRN MG: 1 INJECTION, SOLUTION INTRAMUSCULAR; INTRAVENOUS; SUBCUTANEOUS at 05:36

## 2019-08-15 RX ADMIN — Medication SCH ML: at 09:18

## 2019-08-15 NOTE — VASCULAR LAB REPORT
BILATERAL CAROTID DOPPLER ULTRASOUND



INDICATION : syncope



TECHNIQUE:  Grayscale and color Doppler imaging performed through the neck.



COMPARISON:  None



FINDINGS:



Right:   There is no significant atherosclerotic disease. Peak systolic velocity in the CCA is 70 cm/
s with end-diastolic velocity of 27 cm/s. Peak systolic velocity in the proximal ICA is 116 cm/s with
 end-diastolic velocity of 48 cm/s. ICA to CCA ratio is less than 2. There is antegrade  flow in the 
ECA and the vertebral artery. 



Left: There is no significant atherosclerotic disease. Peak systolic velocity in the CCA is 78 cm/s w
ith end-diastolic velocity of 32 cm/s. Peak systolic velocity in the proximal ICA is 68 cm/s with end
-diastolic velocity of 34 cm/s. ICA to CCA ratio is less than 2. There is antegrade  flow in the ECA 
and the vertebral artery. 



IMPRESSION: No hemodynamically significant stenosis by NASCET criteria. There is less than 50% lumina
l narrowing in both carotid systems.

 



Signer Name: Christian Braga Jr, MD 

Signed: 8/15/2019 2:31 PM

 Workstation Name: [id number]

## 2019-08-15 NOTE — DISCHARGE SUMMARY
Providers





- Providers


Date of Admission: 


08/13/19 14:24





Date of discharge: 08/15/19


Attending physician: 


JEANNA MARTINEZ





Primary care physician: 


[facility], [place]








Hospitalization


Condition: Stable


Hospital course: 


Patient is a 54 yo  woman with a history of depression, OA, migraines, 

GERD, previous alcohol abuse and extensive secondhand smoke exposure who 

presented with CP, sob after syncopal episodes. Patient also had associated 

shortness of breath and numbness to her hands.  Pain woke her up from sleep this

morning at 6 AM.  She did she thought it was gas and took a Zantac and baking 

soda without relief.  She was walking into [employer] and she had a syncopal 

episode and struck the back of her head and back. S He denies blurry vision, 

focal weakness, or focal numbness.  She states she has not use alcohol or any 

drugs recently.  She only medication she takes is Celexa for depression.  She 

denies calf tenderness, leg edema, history of PE/DVT, or recent travel.  





* EKG: report reviewed (normal sinus rhythm, heart rate of 90/m no ST-T wave 

  changes)


* Chest x-ray: report reviewed (no acute findings)


* CT Scan - head: report reviewed (no acute findings)


* V/Q scan lungs: intermediate for PE





Chest pains, appears GERD related: ordered CTA chest, negative for PE, stress 

test reported as negative


Elevated D-Dimer: v/q intermediate, low pre-test, get stat CTA chest==>neg for 

PE


Syncope: ordered neurochecks, ECHO and Carotid u/s, UDS and ETOH wasn't done on 

admission, low value now


Hypertension suspected initiated on losartan 25 mg once a day by Dr. Granados


Depression, denies SI: Continue Celexa


GERD: treat with ppi


Hypokalemia, Mild: Supplemented, monitor bmp


DVT prophylaxis reviewed





Disposition: home, stress test delayed because v/q done, no exercise stress due 

to back pains and syncope history, reviewed CTA chest, ECHO, carotid u/s, 

unrevealing, will discharge home on pain medications





Disposition: DC-01 TO HOME OR SELFCARE


Time spent for discharge: 31 minutes





Core Measure Documentation





- Palliative Care


Palliative Care/ Comfort Measures: Not Applicable





- Core Measures


Any of the following diagnoses?: none





- VTE Discharge Requirements


Deep Vein Thrombosis/Pulmonary Embolism Present on Admission: No


Has pt received <5 days of overlap therapy or INR<2.0: No


Anticoagulant overlap therapy prescribed at discharge: No


Contraindication No Overlap Therapy order at DC: Not Indicated





Exam





- Physical Exam


Narrative exam: 


Gen: WDWN, NAD, Awake, Alert, Orientated 


HEENT: NCAT, EOMI, PERRL, OP Clear 


Neck: supple, no adenopathy, no thyromegaly, no JVD 


CVS/Heart: RRR, normal S1S2, pulses present bilaterally 


Chest/Lungs: CTA B, Symmetrical chest expansion, good air entry bilaterally 


GI/Abdomen: soft, NTND, good bowel sounds, no guarding or rebound 


/Bladder: no suprapubic tenderness, no CVA or paraspinal tenderness 


Extermity/Skin: no c/c/e, no obvious rash 


MSK: FROM x 4 


Neuro: CN 2-12 grossly intact, no new focal deficits 


Psych: calm 





- Constitutional


Vitals: 


                                        











Temp Pulse Resp BP Pulse Ox


 


 97.9 F   76   12   129/86   99 


 


 08/15/19 12:55  08/15/19 12:55  08/15/19 12:55  08/15/19 12:55  08/15/19 12:55














Plan


Activity: other (no strenous activity unless cleared by PCP)


Diet: low salt


Follow [facility] with: 


[facility] - 7 Days


ROBY RUVALCABA MD [Staff Physician] - 7 Days


Prescriptions: 


Citalopram Hydrobromide [Celexa] 40 mg PO DAILY #30 tablet


oxyCODONE /ACETAMINOPHEN [Percocet 5/325 mg] 1 tab PO Q6H PRN #20 tablet


 PRN Reason: Pain , Severe (7-10)


Pantoprazole [Protonix] 40 mg PO QDAY #7 tablet

## 2019-08-15 NOTE — CAT SCAN REPORT
CT angio chest



INDICATION / CLINICAL INFORMATION:

syncope, chest pains.



TECHNIQUE:

Axial CT images were obtained after injection of 100 cc Omnipaque 350 IV contrast using CTA protocol.
 3 plane MIP / 3D reconstructions were produced. All CT scans at this location are performed using CT
 dose reduction for ALARA by means of automated exposure control. 



COMPARISON:

None available.



FINDINGS:

The lungs contain no discrete mass, infiltrate or pleural fluid. There is groundglass opacity bilater
ally with associated mosaic attenuation. Mild atelectasis/scarring is also present.



Negative for mediastinal mass or adenopathy. There is no aneurysm, dissection or pulmonary embolus. I
maging of the upper abdomen is unremarkable.



IMPRESSION:

1. Negative for pulmonary embolus or pneumonia.

2. Mosaic attenuation is a nonspecific finding. Reactive airway disease is the most likely etiology.



Signer Name: Sher Robledo MD 

Signed: 8/15/2019 1:12 PM

 Workstation Name: [id number]

## 2019-08-16 NOTE — TREADMILL REPORT
NUCLEAR CARDIAC IMAGING REPORT.



INDICATION FOR PROCEDURE:  Chest pain.  Informed consent was obtained.



Vasodilator stress was achieved with the intravenous administration of 0.4 mg of

Lexiscan per protocol.  Rest and stress nuclear cardiac imaging was performed

following the intravenous administration of technetium-99m Myoview per protocol.

 Gated SPECT imaging demonstrates a post-stress left ventricular ejection

fraction of 72% with normal wall motion.  Myocardial perfusion imaging

demonstrates no significant cavity change between stress and rest.  No

significant stress induced perfusion defects are seen.



Nuclear cardiac imaging demonstrates grossly normal post-stress left ventricular

systolic function with no significant evidence for myocardial ischemia or

necrosis.





DD: 08/15/2019 16:12

DT: 08/16/2019 02:52

[facility]# [id number]  [id number]

BRARVIN/NTS

## 2020-06-09 ENCOUNTER — HOSPITAL ENCOUNTER (EMERGENCY)
Dept: HOSPITAL 5 - ED | Age: 55
Discharge: HOME | End: 2020-06-09
Payer: COMMERCIAL

## 2020-06-09 DIAGNOSIS — R56.9: ICD-10-CM

## 2020-06-09 DIAGNOSIS — Z88.8: ICD-10-CM

## 2020-06-09 DIAGNOSIS — G43.909: ICD-10-CM

## 2020-06-09 DIAGNOSIS — Z79.899: ICD-10-CM

## 2020-06-09 DIAGNOSIS — I10: ICD-10-CM

## 2020-06-09 DIAGNOSIS — F32.9: ICD-10-CM

## 2020-06-09 DIAGNOSIS — I25.2: ICD-10-CM

## 2020-06-09 DIAGNOSIS — F17.200: ICD-10-CM

## 2020-06-09 DIAGNOSIS — Z90.89: ICD-10-CM

## 2020-06-09 DIAGNOSIS — M25.512: ICD-10-CM

## 2020-06-09 DIAGNOSIS — M19.91: ICD-10-CM

## 2020-06-09 DIAGNOSIS — R07.89: Primary | ICD-10-CM

## 2020-06-09 DIAGNOSIS — Z90.710: ICD-10-CM

## 2020-06-09 DIAGNOSIS — Z98.890: ICD-10-CM

## 2020-06-09 LAB
BASOPHILS # (AUTO): 0.1 K/MM3 (ref 0–0.1)
BASOPHILS NFR BLD AUTO: 1.1 % (ref 0–1.8)
BUN SERPL-MCNC: 10 MG/DL (ref 7–17)
BUN/CREAT SERPL: 11 %
CALCIUM SERPL-MCNC: 8.9 MG/DL (ref 8.4–10.2)
EOSINOPHIL # BLD AUTO: 0.2 K/MM3 (ref 0–0.4)
EOSINOPHIL NFR BLD AUTO: 2.7 % (ref 0–4.3)
HCT VFR BLD CALC: 40.1 % (ref 30.3–42.9)
HEMOLYSIS INDEX: 11
HGB BLD-MCNC: 13.2 GM/DL (ref 10.1–14.3)
LYMPHOCYTES # BLD AUTO: 2.3 K/MM3 (ref 1.2–5.4)
LYMPHOCYTES NFR BLD AUTO: 31.8 % (ref 13.4–35)
MCHC RBC AUTO-ENTMCNC: 33 % (ref 30–34)
MCV RBC AUTO: 89 FL (ref 79–97)
MONOCYTES # (AUTO): 0.6 K/MM3 (ref 0–0.8)
MONOCYTES % (AUTO): 8.3 % (ref 0–7.3)
PLATELET # BLD: 259 K/MM3 (ref 140–440)
RBC # BLD AUTO: 4.52 M/MM3 (ref 3.65–5.03)

## 2020-06-09 PROCEDURE — 71045 X-RAY EXAM CHEST 1 VIEW: CPT

## 2020-06-09 PROCEDURE — 99285 EMERGENCY DEPT VISIT HI MDM: CPT

## 2020-06-09 PROCEDURE — 70450 CT HEAD/BRAIN W/O DYE: CPT

## 2020-06-09 PROCEDURE — A9540 TC99M MAA: HCPCS

## 2020-06-09 PROCEDURE — 78580 LUNG PERFUSION IMAGING: CPT

## 2020-06-09 PROCEDURE — 96374 THER/PROPH/DIAG INJ IV PUSH: CPT

## 2020-06-09 PROCEDURE — 93005 ELECTROCARDIOGRAM TRACING: CPT

## 2020-06-09 PROCEDURE — 84484 ASSAY OF TROPONIN QUANT: CPT

## 2020-06-09 PROCEDURE — 36415 COLL VENOUS BLD VENIPUNCTURE: CPT

## 2020-06-09 PROCEDURE — 85025 COMPLETE CBC W/AUTO DIFF WBC: CPT

## 2020-06-09 PROCEDURE — 85379 FIBRIN DEGRADATION QUANT: CPT

## 2020-06-09 PROCEDURE — 96375 TX/PRO/DX INJ NEW DRUG ADDON: CPT

## 2020-06-09 PROCEDURE — 73030 X-RAY EXAM OF SHOULDER: CPT

## 2020-06-09 PROCEDURE — 96376 TX/PRO/DX INJ SAME DRUG ADON: CPT

## 2020-06-09 PROCEDURE — 80048 BASIC METABOLIC PNL TOTAL CA: CPT

## 2020-06-09 NOTE — XRAY REPORT
EXAMINATION: Left shoulder radiograph, 2 views



CLINICAL INFORMATION: Left shoulder pain after fall



COMPARISON: None.



FINDINGS: There is no evidence of acute fracture or dislocation of the left shoulder. Mild bony degen
erative changes are noted.



Signer Name: Aileen Hawkins MD 

Signed: 6/9/2020 9:04 PM

Workstation Name: [id number]

## 2020-06-09 NOTE — NUCLEAR MEDICINE REPORT
NUCLEAR MEDICINE PERFUSION LUNG SCAN



INDICATION:

Shortness of breath



Only perfusion imaging was obtained due to current COVID precaution policies.



TECHNIQUE:



5.0 mCi of Tc 99m MAA were given by IV.



FINDINGS:

Comparison with chest radiograph from 6/9/2020.



No wedge-shaped pleural-based perfusion defects are noted. 



IMPRESSION:

Low probability for pulmonary embolism based on perfusion only images.



Signer Name: Aileen Hawkins MD 

Signed: 6/9/2020 10:33 PM

Workstation Name: [id number]

## 2020-06-09 NOTE — XRAY REPORT
CHEST 1 VIEW 6/9/2020 4:40 PM



INDICATION / CLINICAL INFORMATION:

Chest Pain.



COMPARISON: 

Chest x-ray 12/30/2019



FINDINGS:



SUPPORT DEVICES: None.



HEART / MEDIASTINUM: No significant abnormality. 



LUNGS / PLEURA: No significant pulmonary or pleural abnormality. No pneumothorax. 



ADDITIONAL FINDINGS: No significant additional findings.



IMPRESSION:

1. No acute findings.



Signer Name: Guy Zuniga MD 

Signed: 6/9/2020 5:00 PM

Workstation Name: [id number]

## 2020-06-09 NOTE — CAT SCAN REPORT
CT head/brain wo con



INDICATION / CLINICAL INFORMATION:

54 years Female; MAIN: headache. GLF at bedside. Pain to left side of head.. 



TECHNIQUE: Routine CT head without contrast. All CT scans at this location are performed using CT dos
e reduction for ALARA by means of automated exposure control. 



COMPARISON: 

The study is compared to the previous CT of 8/13/2019.



FINDINGS:



BRAIN / INTRACRANIAL CONTENTS: There is mild cerebral atrophy. The ventricular system appears appropr
iate in size and configuration. The brain parenchyma appears to demonstrate appropriate attenuation f
or age. There is no clear CT evidence of acute intracranial hemorrhage or significant mass effect. 



ORBITS: No significant abnormality of visualized orbits.

SINUSES / MASTOIDS: No significant abnormality in the visualized paranasal sinuses or mastoid air lory
ls.



CRANIOCERVICAL JUNCTION: No significant abnormality.

ADDITIONAL FINDINGS: None. 



IMPRESSION:

1. There is no CT evidence of acute intracranial process. 



Signer Name: Cliff Mcgill MD 

Signed: 6/9/2020 8:35 PM

Workstation Name: [id number]

## 2020-06-09 NOTE — EMERGENCY DEPARTMENT REPORT
HPI





<PHILIPPEPATTIE - Last Filed: 06/09/20 22:44>





- HPI


HPI: 





54-year-old  female presents to the emergency department via EMS with a

complaint of some midsternal chest pain with radiation to the back that started 

a few hours prior to presentation.  She has a past medical history of 

hypertension, migraines, arthritis, depression and a questionable history of 

previous MI but no cardiac stents.  The patient received a full dose aspirin and

1 sublingual nitro in route with EMS without much relief.  She is a tobacco 

smoker but denies any illicit drug use.  Patient denies having any primary care 

physician or cardiologist.  No recent travel or sick contacts at home.





<BARBARA WHITLOCK - Last Filed: 06/10/20 10:21>





- General


Chief Complaint: Chest Pain


Time Seen by Provider: 06/09/20 16:46





ED Past Medical Hx





<PHILIPPEPATTIE - Last Filed: 06/09/20 22:44>





- Past Medical History


Previous Medical History?: Yes


Hx Hypertension: Yes


Hx Heart Attack/AMI: Yes


Hx Congestive Heart Failure: No


Hx Diabetes: No


Hx Liver Disease: No


Hx Renal Disease: No


Hx Arthritis: Yes


Hx Headaches / Migraines: Yes (migraines)


Hx Seizures: Yes (7 years ago x1, due to ETOH)


Hx Psychiatric Treatment:  (depression)


Hx Asthma: No


Hx COPD: No





- Surgical History


Hx Cholecystectomy: Yes


Additional Surgical History: TWISTED BOWEL.  Hysterectomy, salpingectomy, 

oophorectomy, small bowel resection with reanastomosis





- Social History


Smoking Status: Current Every Day Smoker


Substance Use Type: None





<BARBARA WHITLOCK - Last Filed: 06/10/20 10:21>





- Medications


Home Medications: 


                                Home Medications











 Medication  Instructions  Recorded  Confirmed  Last Taken  Type


 


Citalopram Hydrobromide [Celexa] 40 mg PO DAILY #30 tablet 08/15/19 06/09/20 

06/09/20 Rx














ED Review of Systems


ROS: 


Stated complaint: CHEST PAIN


Other details as noted in HPI








<PATTIE PAEZ - Last Filed: 06/09/20 22:44>


ROS: 


Stated complaint: CHEST PAIN


Other details as noted in HPI





Comment: All other systems reviewed and negative


Constitutional: denies: chills, fever


Eyes: denies: eye pain, vision change


ENT: denies: ear pain, throat pain


Respiratory: denies: cough, shortness of breath


Cardiovascular: chest pain.  denies: palpitations


Gastrointestinal: denies: abdominal pain, vomiting


Genitourinary: denies: dysuria, discharge


Musculoskeletal: back pain.  denies: arthralgia


Skin: denies: rash, lesions


Neurological: denies: headache, weakness





<BARBARA WHITLOCK - Last Filed: 06/10/20 10:21>





Physical Exam





- Physical Exam


Vital Signs: 


                                   Vital Signs











  06/09/20 06/09/20 06/09/20





  16:31 16:40 19:22


 


Temperature 97.5 F L  


 


Pulse Rate 81 71 


 


Respiratory 20 20 18





Rate   


 


Blood Pressure 135/71  


 


Blood Pressure   





[Left]   


 


O2 Sat by Pulse 98  





Oximetry   














  06/09/20 06/09/20 06/09/20





  19:25 19:52 20:45


 


Temperature 97.6 F  


 


Pulse Rate 74  


 


Respiratory 22 18 18





Rate   


 


Blood Pressure   


 


Blood Pressure 128/88  





[Left]   


 


O2 Sat by Pulse 97  





Oximetry   














  06/09/20





  21:15


 


Temperature 


 


Pulse Rate 


 


Respiratory 18





Rate 


 


Blood Pressure 


 


Blood Pressure 





[Left] 


 


O2 Sat by Pulse 





Oximetry 














<PATTIE PAEZ - Last Filed: 06/09/20 22:44>





- Physical Exam


Vital Signs: 


                                   Vital Signs











  06/09/20 06/09/20





  16:31 16:40


 


Temperature 97.5 F L 


 


Pulse Rate 81 71


 


Respiratory 20 20





Rate  


 


Blood Pressure 135/71 


 


O2 Sat by Pulse 98 





Oximetry  











Physical Exam: 





GENERAL: The patient is well-developed well-nourished.


HENT: Normocephalic.  Atraumatic.    Patient has moist mucous membranes.


EYES: Extraocular motions are intact. 


NECK: Supple. Trachea is midline.


CHEST/LUNGS: Clear to auscultation.  There is no respiratory distress noted.


HEART/CARDIOVASCULAR: Regular.  There is no tachycardia.  There is no murmur.


ABDOMEN: Abdomen is soft, nontender.  Patient has normal bowel sounds.  


SKIN: Skin is warm and dry. 


NEURO: The patient is awake, alert, and oriented.  The patient is cooperative.  

The patient has no focal neurologic deficits.  Normal speech.


MUSCULOSKELETAL: There is no tenderness or deformity.  There is no limitation 

range of motion.  There is no evidence of acute injury.





<BARBARA WHITLOCK - Last Filed: 06/10/20 10:21>





ED Course


                                   Vital Signs











  06/09/20 06/09/20 06/09/20





  16:31 16:40 19:22


 


Temperature 97.5 F L  


 


Pulse Rate 81 71 


 


Respiratory 20 20 18





Rate   


 


Blood Pressure 135/71  


 


Blood Pressure   





[Left]   


 


O2 Sat by Pulse 98  





Oximetry   














  06/09/20 06/09/20 06/09/20





  19:25 19:52 20:45


 


Temperature 97.6 F  


 


Pulse Rate 74  


 


Respiratory 22 18 18





Rate   


 


Blood Pressure   


 


Blood Pressure 128/88  





[Left]   


 


O2 Sat by Pulse 97  





Oximetry   














  06/09/20





  21:15


 


Temperature 


 


Pulse Rate 


 


Respiratory 18





Rate 


 


Blood Pressure 


 


Blood Pressure 





[Left] 


 


O2 Sat by Pulse 





Oximetry 














<PATTIE PAEZ - Last Filed: 06/09/20 22:44>


                                   Vital Signs











  06/09/20 06/09/20





  16:31 16:40


 


Temperature 97.5 F L 


 


Pulse Rate 81 71


 


Respiratory 20 20





Rate  


 


Blood Pressure 135/71 


 


O2 Sat by Pulse 98 





Oximetry  














- Reevaluation(s)


Reevaluation #1: 





06/09/20 20:12


The patient made the claim that she had fallen out of bed causing her to have 

left shoulder pain, hit the left side of her head, and asking for more pain 

medication.  However the patient was witnessed by ED staff gently lowering 

herself to the ground.  Patient was reevaluated and there are no obvious signs 

of trauma to any of the areas the patient complains of.  Nonetheless, an x-ray 

of the left shoulder was done that does not show any fracture, dislocation, or 

any acute process.





<BARBARA WHITLOCK - Last Filed: 06/10/20 10:21>





ED Medical Decision Making





- Lab Data


Result diagrams: 


                                 06/09/20 16:41





                                 06/09/20 16:41





- Medical Decision Making





Nuclear perfusion scan was ordered to rule out pulmonary embolism.  CT angiogram

unable to be performed due difficult IV access.





Patient insisted on being discharged prior to perfusion scan results.  She was 

quite disruptive.  She appeared to be dishonest regarding her fall here in the 

emergency department.  It was unwitnessed.  I am concerned for drug-seeking 

behavior.  She is quite agitated and disruptive to several members of our 

medical staff.





Perfusion scan low probability for pulmonary embolism.  Considering patient does

not currently have chest pain shortness of breath or tachycardia I do not 

suspect pulmonary embolism on clinical presentation.  Patient is mostly 

concerned for left arm pain after fall.





<PATTIE PAEZ - Last Filed: 06/09/20 22:44>





- Lab Data


Result diagrams: 


                                 06/09/20 16:41





                                 06/09/20 16:41





- EKG Data


-: EKG Interpreted by Me


EKG shows normal: sinus rhythm, axis, intervals, QRS complexes, ST-T waves 

(Nonspecific T waves)


Rate: normal





- EKG Data


When compared to previous EKG there are: no significant change


Interpretation: unchanged when compared t (12/30/19)





- Radiology Data


Radiology results: report reviewed, image reviewed


interpreted by me: 





Chest x-ray does not show any acute process.  There are no pleural effusions, 

obvious pneumonia and there is no pneumothorax.





X-ray of the left shoulder does not show any fracture, dislocation, or any acute

process.





NUCLEAR MEDICINE PERFUSION LUNG SCAN INDICATION: Shortness of breath Only 

perfusion imaging was obtained due to current COVID precaution policies. 

TECHNIQUE: 5.0 mCi of Tc 99m MAA were given by IV. FINDINGS: Comparison with c

hest radiograph from 6/9/2020. No wedge-shaped pleural-based perfusion defects 

are noted. IMPRESSION: Low probability for pulmonary embolism based on perfusion

only images. 





CT head/brain wo con INDICATION / CLINICAL INFORMATION: 54 years Female; MAIN: 

headache. GLF at bedside. Pain to left side of head.. TECHNIQUE: Routine CT head

without contrast. All CT scans at this location are performed using CT dose 

reduction for ALARA by means of automated exposure control. COMPARISON: The 

study is compared to the previous CT of 8/13/2019. FINDINGS: BRAIN / 

INTRACRANIAL CONTENTS: There is mild cerebral atrophy. The ventricular system 

appears appropriate in size and configuration. The brain parenchyma appears to 

demonstrate appropriate at tenuation for age. There is no clear CT evidence of 

acute intracranial hemorrhage or significant mass effect. ORBITS: No significant

abnormality of visualized orbits. SINUSES / MASTOIDS: No significant abnormality

in the visualized paranasal sinuses or mastoid air cells. CRANIOCERVICAL 

JUNCTION: No significant abnormality. ADDITIONAL FINDINGS: None. IMPRESSION: 1. 

There is no CT evidence of acute intracranial process. 





- Medical Decision Making





This patient presents with a complaint of some midsternal chest pain with 

radiation to the back and shoulder.  On examination she has normal heart and 

lung sounds auscultation and does not appear in any acute distress.  EKG does 

not have any morphology consistent with ST elevation MI or any significant 

dysrhythmia.  Chest x-ray does not show any acute process.  Patient's labs have 

been unremarkable including CBC, metabolic panel, negative troponins x2 but she 

did have a slightly elevated and equivocal d-dimer.  With a history of previous 

pulmonary embolism, the patient will have a CT angiography of the chest.  The 

patient had a stress test that was negative in September 2019.  Given the 

unremarkable work-up thus far and this previously negative stress test, there is

a low suspicion that the patient has any obstructive coronary artery disease at 

this time.  The patient also has a low heart and ARIELLA score.  She will be set up

for outpatient follow-up with [facility] as part of our low risk 

chest pain protocol.





It appears that the CT angiography was unable to be performed secondary to IV 

access but a perfusion study was done that was low probability for PE.





Patient says that she had a fall from bed.  However she was witnessed by 1 of 

the other patients to have gotten out of bed and laid on the ground.  This 

appears to be for secondary gain as the patient then started demanding pain 

medication.  However, secondary to her complaint of shoulder pain, x-ray was 

done that did not show any fracture, dislocation, or any acute process.  The 

patient also claimed that she hit her head so a CT scan of the head was done 

that did not show any bleed, shift, mass, ischemia, skull fracture, or any other

acute process.  On examination the patient does not appear to have any obvious 

signs of trauma.











<BARBARA WHITLOCK S - Last Filed: 06/10/20 10:21>


Critical care attestation.: 


If time is entered above; I have spent that time in minutes in the direct care 

of this critically ill patient, excluding procedure time.








<PATTIE PAEZ - Last Filed: 06/09/20 22:44>


Critical Care Time: No


Critical care attestation.: 


If time is entered above; I have spent that time in minutes in the direct care 

of this critically ill patient, excluding procedure time.








<BARBARA WHITLOCK - Last Filed: 06/10/20 10:21>





ED Disposition


Is pt being admited?: No


Does the pt Need Aspirin: No





<PATTIE PAEZ - Last Filed: 06/09/20 22:44>


Is pt being admited?: No


Time of Disposition: 20:05





<SHEAR,BARBARA S - Last Filed: 06/10/20 10:21>


Clinical Impression: 


Chest pain


Qualifiers:


 Chest pain type: unspecified Qualified Code(s): R07.9 - Chest pain, unspecified





Left shoulder pain


Qualifiers:


 Chronicity: acute Qualified Code(s): M25.512 - Pain in left shoulder





Disposition: DC-01 TO HOME OR SELFCARE


Condition: Stable


Instructions:  Chest Pain (ED), Arthralgia (ED)


Additional Instructions: 


Please follow-up with a primary care physician in the next few days.  I have 

sent your contact information over to [facility] and 

someone from their office should be contacting you shortly for close outpatient 

follow-up.  I have also given you a referral for a local orthopedist, Dr. Antoine, to follow-up regarding your left shoulder pain.  Return to the emergency

department with any worsening of your symptoms or any acute distress.


Referrals: 


PRIMARY MD AMAN [Primary Care Provider] - 3-5 Days


SAMANTHA HDEZ MD [Staff Physician] - 3-5 Days


ZOILA ANTOINE MD [Staff Physician] - 3-5 Days

## 2020-06-10 VITALS — DIASTOLIC BLOOD PRESSURE: 78 MMHG | SYSTOLIC BLOOD PRESSURE: 148 MMHG
